# Patient Record
Sex: MALE | Race: WHITE | NOT HISPANIC OR LATINO
[De-identification: names, ages, dates, MRNs, and addresses within clinical notes are randomized per-mention and may not be internally consistent; named-entity substitution may affect disease eponyms.]

---

## 2022-01-12 ENCOUNTER — APPOINTMENT (OUTPATIENT)
Dept: ORTHOPEDIC SURGERY | Facility: CLINIC | Age: 62
End: 2022-01-12
Payer: MEDICAID

## 2022-01-12 VITALS — WEIGHT: 170 LBS | BODY MASS INDEX: 24.34 KG/M2 | HEIGHT: 70 IN

## 2022-01-12 DIAGNOSIS — Z78.9 OTHER SPECIFIED HEALTH STATUS: ICD-10-CM

## 2022-01-12 PROBLEM — Z00.00 ENCOUNTER FOR PREVENTIVE HEALTH EXAMINATION: Status: ACTIVE | Noted: 2022-01-12

## 2022-01-12 PROCEDURE — 20610 DRAIN/INJ JOINT/BURSA W/O US: CPT | Mod: LT

## 2022-01-12 PROCEDURE — 73562 X-RAY EXAM OF KNEE 3: CPT | Mod: LT

## 2022-01-12 PROCEDURE — 99204 OFFICE O/P NEW MOD 45 MIN: CPT | Mod: 25

## 2022-01-12 NOTE — PHYSICAL EXAM
[de-identified] : General: No acute distress, conversant, well-nourished.\par Head: Normocephalic, atraumatic\par Neck: trachea midline, FROM\par Heart: normotensive and normal rate and rhythm\par Lungs: No labored breathing\par Skin: No abrasions, no rashes, no edema\par Psych: Alert and oriented to person, place and time\par Extremities: no peripheral edema or digital cyanosis\par Gait: Normal gait. Can perform tandem gait.  \par Vascular: warm and well perfused distally, palpable distal pulses\par \par Left Knee with no erythema, no effusion.  \par No tenderness to palpation of knee.\par + medial joint line tenderness.\par No lateral joint line tenderness.\par \par Range of motion: 0 - 130 degrees\par + pain with range of motion.\par \par Positive Georgiana's test.\par Stable to varus and valgus stress.\par Negative Lachman's test, negative anterior and posterior drawer tests.  \par \par Sensation intact to light touch.  \par Normal motor exam.\par Warm and well perfused distally. [de-identified] : I ordered radiographs to evaluate the patient's symptoms.\par \par Left knee 3 view radiographs obtained in the office today shows no fracture or dislocation.  Mild to moderate age-related degenerative changes most prominent in medial and patellofemoral compartments.

## 2022-01-12 NOTE — HISTORY OF PRESENT ILLNESS
[de-identified] : 61 year old male presents with left knee pain.  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  About a year ago he was playing 3 times a week.   He cannot recall a specific injury but has been injured on the field.   He has been doing exercises the past few weeks but these have not allowed him to return to sport.   He denies locking, catching or instability.  He says even walking is painful.  He takes ibuprofen without relief.  He had right knee surgery in the past (MPFL repair).

## 2022-01-12 NOTE — PROCEDURE
[de-identified] : Procedure: Left knee Joint injection with corticosteroid\par Pre-Procedure Diagnosis: Left knee pain\par Post-Procedure Diagnosis: same\par \par The patient was educated about the risks and benefits of a corticosteroid injection.  Alternatives were discussed.  The patient understood and consented for the procedure.\par \par The area was sterilely prepped using isopropyl alcohol.  An ethyl chloride spray provided  local anesthesia.  Using the usual sterile technique, 1 ml of 40mg/1ml of Kenalog and 4 ml of Lidocaine 1% without epinephrine was injected into the joint.  A dressing was applied to the area.  The patient tolerated the procedure well and without complication.\par

## 2022-01-12 NOTE — ASSESSMENT
[FreeTextEntry1] : 61 year old male presents with left knee pain.  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  About a year ago he was playing 3 times a week.   He cannot recall a specific injury but has been injured on the field.   He has been doing exercises the past few weeks but these have not allowed him to return to sport.   He denies locking, catching or instability.  His exam is concerning for a meniscus tear.  His radiographs show osteoarthritis.  He will be sent for a left knee MRI.  He was given a prescription for Meloxicam.  He was given a corticosteroid injection which he tolerated well.  He was given a referral for physical therapy.  He will followup after his MRI. We discussed red flag symptoms that would require emergent evaluation. He knows to call with any questions or concerns or if his symptoms acutely worsen.

## 2022-01-18 ENCOUNTER — TRANSCRIPTION ENCOUNTER (OUTPATIENT)
Age: 62
End: 2022-01-18

## 2022-01-20 ENCOUNTER — APPOINTMENT (OUTPATIENT)
Dept: ORTHOPEDIC SURGERY | Facility: CLINIC | Age: 62
End: 2022-01-20
Payer: MEDICAID

## 2022-01-20 PROCEDURE — 99214 OFFICE O/P EST MOD 30 MIN: CPT

## 2022-01-20 NOTE — ASSESSMENT
[FreeTextEntry1] : 61 year old male followup with left knee pain.  He is an avid  and has been unable to play recently because of knee pain.  Since his last visit he reports significant improvement after his corticosteroid injection.  He is able to jog without issue.  We reviewed his left knee MRI which shows degenerative changes including tricompartmental osteoarthritis.  He has degenerative complex tearing of the lateral meniscus and a radial tear of the medial meniscus.  We discussed treatment options.  His symptoms have much improved after the injection.  He will start physical therapy.  He will continue Meloxicam as needed.  If his symptoms worsen he was given a referral to see my partner Dr Rothman a .  He will followup with me as needed.  We discussed red flag symptoms that would require emergent evaluation. He knows to call with any questions or concerns or if his symptoms acutely worsen.

## 2022-01-20 NOTE — HISTORY OF PRESENT ILLNESS
[de-identified] : 61 year old male followup with left knee pain.  He is an avid  and has been unable to play recently because of knee pain.  Since his last visit he reports significant improvement after his corticosteroid injection.  He is able to jog without issue.  He takes Meloxicam as needed with relief.   He denies locking, catching or instability. He is here to review his MRI.

## 2022-01-20 NOTE — PHYSICAL EXAM
[de-identified] : General: No acute distress, conversant, well-nourished.\par Head: Normocephalic, atraumatic\par Neck: trachea midline, FROM\par Heart: normotensive and normal rate and rhythm\par Lungs: No labored breathing\par Skin: No abrasions, no rashes, no edema\par Psych: Alert and oriented to person, place and time\par Extremities: no peripheral edema or digital cyanosis\par Gait: Normal gait. Can perform tandem gait.  \par Vascular: warm and well perfused distally, palpable distal pulses\par \par Left Knee with no erythema, no effusion.  \par No tenderness to palpation of knee.\par \par Range of motion: 0 - 130 degrees\par No pain with range of motion.\par \par Negative Georgiana's test.\par Stable to varus and valgus stress.\par Negative Lachman's test, negative anterior and posterior drawer tests.  \par \par Sensation intact to light touch.  \par Normal motor exam.\par Warm and well perfused distally. [de-identified] : Left knee MRI (1/15/22): 1.  Radial tear of the posterior root of the medial meniscus. The body is extruded.\par 2.  Complex tear of the anterior horn of the lateral meniscus. Multiseptated parameniscal cyst along the anterior horn.\par 3.  Moderate to severe medial, mild to moderate lateral, and moderate patellofemoral compartment arthrosis.\par 4.  Quadriceps fat pad edema could relate to fat pad impingement.\par 5.  Mild to moderate synovitis in the joint. Intra-articular bodies in the posterior aspect of the medial compartment and in the popliteus recess.\par \par Left knee 3 view radiographs (1/12/22) no fracture or dislocation.  Mild to moderate age-related degenerative changes most prominent in medial and patellofemoral compartments.

## 2022-07-12 ENCOUNTER — APPOINTMENT (OUTPATIENT)
Dept: ORTHOPEDIC SURGERY | Facility: CLINIC | Age: 62
End: 2022-07-12

## 2022-07-12 PROCEDURE — 20610 DRAIN/INJ JOINT/BURSA W/O US: CPT | Mod: RT

## 2022-07-12 PROCEDURE — 99214 OFFICE O/P EST MOD 30 MIN: CPT | Mod: 25

## 2022-07-12 RX ORDER — DICLOFENAC SODIUM 75 MG/1
75 TABLET, DELAYED RELEASE ORAL
Qty: 60 | Refills: 1 | Status: ACTIVE | COMMUNITY
Start: 2022-07-12 | End: 1900-01-01

## 2022-07-12 RX ORDER — DICLOFENAC SODIUM 75 MG/1
75 TABLET, DELAYED RELEASE ORAL
Qty: 60 | Refills: 0 | Status: ACTIVE | COMMUNITY
Start: 2022-07-12 | End: 1900-01-01

## 2022-07-12 NOTE — ASSESSMENT
[FreeTextEntry1] : 62 year old male followup bilateral knee pain.  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  Since his last visit he had great improvement with his left knee pain after a CSI.  He also is now in a [employer] trial for the left knee.  He would like a CSI for his right knee as this is bothering him.  His right knee is no eligible for the [employer] trial.   He was given a right knee CSI which he tolerated well.  He will continue HEP.  He can take Meloxicam as needed. He will followup in 3 months. We discussed red flag symptoms that would require emergent evaluation. He knows to call with any questions or concerns or if his symptoms acutely worsen.

## 2022-07-12 NOTE — PHYSICAL EXAM
[de-identified] : General: No acute distress, conversant, well-nourished.\par Head: Normocephalic, atraumatic\par Neck: trachea midline, FROM\par Heart: normotensive and normal rate and rhythm\par Lungs: No labored breathing\par Skin: No abrasions, no rashes, no edema\par Psych: Alert and oriented to person, place and time\par Extremities: no peripheral edema or digital cyanosis\par Gait: Normal gait. Can perform tandem gait.  \par Vascular: warm and well perfused distally, palpable distal pulses\par \par Left Knee with no erythema, no effusion.  \par No tenderness to palpation of knee.\par \par Range of motion: 0 - 130 degrees\par No pain with range of motion.\par \par Negative Georgiana's test.\par Stable to varus and valgus stress.\par Negative Lachman's test, negative anterior and posterior drawer tests.  \par \par Sensation intact to light touch.  \par Normal motor exam.\par Warm and well perfused distally.\par \par Right Knee with no erythema, no effusion.  \par No tenderness to palpation of knee.\par \par Range of motion: 0 - 130 degrees\par + pain with range of motion.\par \par Negative Georgiana's test.\par Stable to varus and valgus stress.\par Negative Lachman's test, negative anterior and posterior drawer tests.  \par \par Sensation intact to light touch.  \par Normal motor exam.\par Warm and well perfused distally. [de-identified] : Left knee MRI (1/15/22): 1.  Radial tear of the posterior root of the medial meniscus. The body is extruded.\par 2.  Complex tear of the anterior horn of the lateral meniscus. Multiseptated parameniscal cyst along the anterior horn.\par 3.  Moderate to severe medial, mild to moderate lateral, and moderate patellofemoral compartment arthrosis.\par 4.  Quadriceps fat pad edema could relate to fat pad impingement.\par 5.  Mild to moderate synovitis in the joint. Intra-articular bodies in the posterior aspect of the medial compartment and in the popliteus recess.\par \par Bilateral knee 3 view radiographs (1/12/22) no fracture or dislocation.  Mild to moderate age-related degenerative changes most prominent in medial and patellofemoral compartments.

## 2022-07-12 NOTE — REASON FOR VISIT
[Follow-Up Visit] : a follow-up visit for [Knee Pain] : knee pain [FreeTextEntry2] : Complaining of L knee pain today

## 2022-07-12 NOTE — HISTORY OF PRESENT ILLNESS
[de-identified] : 62 year old male followup bilateral knee pain.  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  Since his last visit he had great improvement with his left knee pain after a CSI.  He also is now in a [employer] trial for the left knee.  He would like a CSI for his right knee as this is bothering him.  His right knee is no eligible for the [employer] trial.   He denies locking, catching or instability.

## 2022-07-12 NOTE — PROCEDURE
[de-identified] : Procedure: Right knee Joint injection with corticosteroid\par Pre-Procedure Diagnosis: Right knee pain\par Post-Procedure Diagnosis: same\par \par The patient was educated about the risks and benefits of a corticosteroid injection.  Alternatives were discussed.  The patient understood and consented for the procedure.\par \par The area was sterilely prepped using isopropyl alcohol.  An ethyl chloride spray provided  local anesthesia.  Using the usual sterile technique, 1 ml of 40mg/1ml of Kenalog and 4 ml of Lidocaine 1% without epinephrine was injected into the joint.  A dressing was applied to the area.  The patient tolerated the procedure well and without complication.\par

## 2022-07-12 NOTE — PHYSICAL EXAM
[de-identified] : General: No acute distress, conversant, well-nourished.\par Head: Normocephalic, atraumatic\par Neck: trachea midline, FROM\par Heart: normotensive and normal rate and rhythm\par Lungs: No labored breathing\par Skin: No abrasions, no rashes, no edema\par Psych: Alert and oriented to person, place and time\par Extremities: no peripheral edema or digital cyanosis\par Gait: Normal gait. Can perform tandem gait.  \par Vascular: warm and well perfused distally, palpable distal pulses\par \par Left Knee with no erythema, no effusion.  \par No tenderness to palpation of knee.\par \par Range of motion: 0 - 130 degrees\par No pain with range of motion.\par \par Negative Georgiana's test.\par Stable to varus and valgus stress.\par Negative Lachman's test, negative anterior and posterior drawer tests.  \par \par Sensation intact to light touch.  \par Normal motor exam.\par Warm and well perfused distally.\par \par Right Knee with no erythema, no effusion.  \par No tenderness to palpation of knee.\par \par Range of motion: 0 - 130 degrees\par + pain with range of motion.\par \par Negative Georgiana's test.\par Stable to varus and valgus stress.\par Negative Lachman's test, negative anterior and posterior drawer tests.  \par \par Sensation intact to light touch.  \par Normal motor exam.\par Warm and well perfused distally. [de-identified] : Left knee MRI (1/15/22): 1.  Radial tear of the posterior root of the medial meniscus. The body is extruded.\par 2.  Complex tear of the anterior horn of the lateral meniscus. Multiseptated parameniscal cyst along the anterior horn.\par 3.  Moderate to severe medial, mild to moderate lateral, and moderate patellofemoral compartment arthrosis.\par 4.  Quadriceps fat pad edema could relate to fat pad impingement.\par 5.  Mild to moderate synovitis in the joint. Intra-articular bodies in the posterior aspect of the medial compartment and in the popliteus recess.\par \par Bilateral knee 3 view radiographs (1/12/22) no fracture or dislocation.  Mild to moderate age-related degenerative changes most prominent in medial and patellofemoral compartments.

## 2022-07-12 NOTE — PROCEDURE
[de-identified] : Procedure: Right knee Joint injection with corticosteroid\par Pre-Procedure Diagnosis: Right knee pain\par Post-Procedure Diagnosis: same\par \par The patient was educated about the risks and benefits of a corticosteroid injection.  Alternatives were discussed.  The patient understood and consented for the procedure.\par \par The area was sterilely prepped using isopropyl alcohol.  An ethyl chloride spray provided  local anesthesia.  Using the usual sterile technique, 1 ml of 40mg/1ml of Kenalog and 4 ml of Lidocaine 1% without epinephrine was injected into the joint.  A dressing was applied to the area.  The patient tolerated the procedure well and without complication.\par

## 2022-07-18 ENCOUNTER — RX RENEWAL (OUTPATIENT)
Age: 62
End: 2022-07-18

## 2022-07-18 RX ORDER — MELOXICAM 15 MG/1
15 TABLET ORAL
Qty: 30 | Refills: 0 | Status: ACTIVE | COMMUNITY
Start: 2022-01-12 | End: 1900-01-01

## 2022-10-11 ENCOUNTER — APPOINTMENT (OUTPATIENT)
Dept: ORTHOPEDIC SURGERY | Facility: CLINIC | Age: 62
End: 2022-10-11

## 2022-10-11 PROCEDURE — 99214 OFFICE O/P EST MOD 30 MIN: CPT | Mod: 25

## 2022-10-11 PROCEDURE — 20610 DRAIN/INJ JOINT/BURSA W/O US: CPT

## 2022-10-11 RX ORDER — DICLOFENAC SODIUM 75 MG/1
75 TABLET, DELAYED RELEASE ORAL
Qty: 60 | Refills: 1 | Status: ACTIVE | COMMUNITY
Start: 2022-10-11 | End: 1900-01-01

## 2022-10-11 NOTE — REASON FOR VISIT
[Follow-Up Visit] : a follow-up visit for [Knee Pain] : knee pain [FreeTextEntry2] : Would like to get new injections today

## 2022-10-11 NOTE — PHYSICAL EXAM
[de-identified] : General: No acute distress, conversant, well-nourished.\par Head: Normocephalic, atraumatic\par Neck: trachea midline, FROM\par Heart: normotensive and normal rate and rhythm\par Lungs: No labored breathing\par Skin: No abrasions, no rashes, no edema\par Psych: Alert and oriented to person, place and time\par Extremities: no peripheral edema or digital cyanosis\par Gait: Normal gait. Can perform tandem gait.  \par Vascular: warm and well perfused distally, palpable distal pulses\par \par Left Knee with no erythema, no effusion.  \par No tenderness to palpation of knee.\par \par Range of motion: 0 - 130 degrees\par No pain with range of motion.\par \par Negative Georgiana's test.\par Stable to varus and valgus stress.\par Negative Lachman's test, negative anterior and posterior drawer tests.  \par \par Sensation intact to light touch.  \par Normal motor exam.\par Warm and well perfused distally.\par \par Right Knee with no erythema, no effusion.  \par No tenderness to palpation of knee.\par \par Range of motion: 0 - 130 degrees\par + pain with range of motion.\par \par Negative Georgiana's test.\par Stable to varus and valgus stress.\par Negative Lachman's test, negative anterior and posterior drawer tests.  \par \par Sensation intact to light touch.  \par Normal motor exam.\par Warm and well perfused distally. [de-identified] : Left knee MRI (1/15/22): 1.  Radial tear of the posterior root of the medial meniscus. The body is extruded.\par 2.  Complex tear of the anterior horn of the lateral meniscus. Multiseptated parameniscal cyst along the anterior horn.\par 3.  Moderate to severe medial, mild to moderate lateral, and moderate patellofemoral compartment arthrosis.\par 4.  Quadriceps fat pad edema could relate to fat pad impingement.\par 5.  Mild to moderate synovitis in the joint. Intra-articular bodies in the posterior aspect of the medial compartment and in the popliteus recess.\par \par Bilateral knee 3 view radiographs (1/12/22) no fracture or dislocation.  Mild to moderate age-related degenerative changes most prominent in medial and patellofemoral compartments.

## 2022-10-11 NOTE — ASSESSMENT
[FreeTextEntry1] : 62 year old male followup for acute exacerbation of chronic bilateral knee pain.  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 Since his last visit he had over 2 months of relief of his right knee pain with a CSI.  His pain has returned.  He was given a right knee corticosteroid injection which he tolerated well.  He has noted locking symptoms with his right knee.  He will be sent for a right knee MRI. He can continue diclofenac.  He can continue PT. He will followup once his MRI is completed.  We discussed red flag symptoms that would require emergent evaluation. He knows to call with any questions or concerns or if his symptoms acutely worsen.

## 2022-10-11 NOTE — PROCEDURE
[de-identified] : Procedure: Right knee Joint injection with corticosteroid\par Pre-Procedure Diagnosis: Right knee pain\par Post-Procedure Diagnosis: same\par \par The patient was educated about the risks and benefits of a corticosteroid injection.  Alternatives were discussed.  The patient understood and consented for the procedure.\par \par The area was sterilely prepped using isopropyl alcohol.  An ethyl chloride spray provided  local anesthesia.  Using the usual sterile technique, 1 ml of 40mg/1ml of Kenalog and 4 ml of Lidocaine 1% without epinephrine was injected into the joint.  A dressing was applied to the area.  The patient tolerated the procedure well and without complication.\par

## 2022-10-11 NOTE — HISTORY OF PRESENT ILLNESS
[de-identified] : 62 year old male followup for acute exacerbation of chronic bilateral knee pain.  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 Since his last visit he had over 2 months of relief of his right knee pain with a CSI.  His pain has returned and he would like a new injection.  He has noted locking symptoms with his right knee.

## 2022-10-20 ENCOUNTER — APPOINTMENT (OUTPATIENT)
Dept: ORTHOPEDIC SURGERY | Facility: CLINIC | Age: 62
End: 2022-10-20

## 2022-10-20 ENCOUNTER — NON-APPOINTMENT (OUTPATIENT)
Age: 62
End: 2022-10-20

## 2022-10-20 PROCEDURE — 99214 OFFICE O/P EST MOD 30 MIN: CPT

## 2022-11-14 NOTE — PHYSICAL EXAM
[de-identified] : General: No acute distress, conversant, well-nourished.\par Head: Normocephalic, atraumatic\par Neck: trachea midline, FROM\par Heart: normotensive and normal rate and rhythm\par Lungs: No labored breathing\par Skin: No abrasions, no rashes, no edema\par Psych: Alert and oriented to person, place and time\par Extremities: no peripheral edema or digital cyanosis\par Gait: Normal gait. Can perform tandem gait. \par Vascular: warm and well perfused distally, palpable distal pulses\par \par Left Knee with no erythema, no effusion. \par No tenderness to palpation of knee.\par \par Range of motion: 0 - 130 degrees\par No pain with range of motion.\par \par Negative Georgiana's test.\par Stable to varus and valgus stress.\par Negative Lachman's test, negative anterior and posterior drawer tests. \par \par Sensation intact to light touch. \par Normal motor exam.\par Warm and well perfused distally.\par \par Right Knee with no erythema, no effusion. \par No tenderness to palpation of knee.\par \par Range of motion: 0 - 130 degrees\par + pain with range of motion.\par \par Negative Georgiana's test.\par Stable to varus and valgus stress.\par Negative Lachman's test, negative anterior and posterior drawer tests. \par \par Sensation intact to light touch. \par Normal motor exam.\par Warm and well perfused distally.  [de-identified] : Right knee MRI (10/17/22): 1. Tricompartmental arthrosis, severe in the lateral and patellofemoral compartments, moderate in the medial compartment.\par 2. Severely degenerated and torn (nearly absent) anterior horn and body of the lateral meniscus.\par 3. Small to moderate joint effusion, leaking small to moderate popliteal cyst.\par \par Left knee MRI (1/15/22): 1. Radial tear of the posterior root of the medial meniscus. The body is extruded.\par 2. Complex tear of the anterior horn of the lateral meniscus. Multiseptated parameniscal cyst along the anterior horn.\par 3. Moderate to severe medial, mild to moderate lateral, and moderate patellofemoral compartment arthrosis.\par 4. Quadriceps fat pad edema could relate to fat pad impingement.\par 5. Mild to moderate synovitis in the joint. Intra-articular bodies in the posterior aspect of the medial compartment and in the popliteus recess.\par \par Bilateral knee 3 view radiographs (1/12/22) no fracture or dislocation. Mild to moderate age-related degenerative changes most prominent in medial and patellofemoral compartments.

## 2022-11-14 NOTE — HISTORY OF PRESENT ILLNESS
[de-identified] : 62 year old male followup for acute exacerbation of chronic bilateral knee pain.  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 He reports decreased pain in his right knee after a CSI.  He takes diclofenac with relief. He is here to review his MRI.

## 2022-11-14 NOTE — ASSESSMENT
[FreeTextEntry1] : 62 year old male followup for acute exacerbation of chronic bilateral knee pain.  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 He reports decreased pain in his right knee after a CSI. We reviewed his MRI which shows degenerative changes.  He can continue diclofenac.  He can continue PT.  He will followup in 3 months. We discussed red flag symptoms that would require emergent evaluation. He knows to call with any questions or concerns or if his symptoms acutely worsen.

## 2023-02-21 ENCOUNTER — APPOINTMENT (OUTPATIENT)
Dept: ORTHOPEDIC SURGERY | Facility: CLINIC | Age: 63
End: 2023-02-21
Payer: MEDICAID

## 2023-02-21 DIAGNOSIS — M25.562 PAIN IN LEFT KNEE: ICD-10-CM

## 2023-02-21 PROCEDURE — 20610 DRAIN/INJ JOINT/BURSA W/O US: CPT

## 2023-02-21 PROCEDURE — 99214 OFFICE O/P EST MOD 30 MIN: CPT | Mod: 25

## 2023-02-21 RX ORDER — MELOXICAM 15 MG/1
15 TABLET ORAL
Qty: 30 | Refills: 2 | Status: ACTIVE | COMMUNITY
Start: 2023-02-21 | End: 1900-01-01

## 2023-02-21 NOTE — PROCEDURE
[de-identified] : Procedure: Right knee Joint injection with corticosteroid\par Pre-Procedure Diagnosis: Right knee pain\par Post-Procedure Diagnosis: same\par \par The patient was educated about the risks and benefits of a corticosteroid injection.  Alternatives were discussed.  The patient understood and consented for the procedure.\par \par The area was sterilely prepped using isopropyl alcohol.  An ethyl chloride spray provided  local anesthesia.  Using the usual sterile technique, 1 ml of 40mg/1ml of Kenalog and 4 ml of Lidocaine 1% without epinephrine was injected into the joint.  A dressing was applied to the area.  The patient tolerated the procedure well and without complication.\par

## 2023-02-21 NOTE — HISTORY OF PRESENT ILLNESS
[de-identified] : 62 year old male followup for acute exacerbation of chronic bilateral knee pain.  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 He had a right knee CSI at his last appointment that provided relief but his pain has returned and he would like a new injection. He takes meloxicam with relief.

## 2023-02-21 NOTE — PHYSICAL EXAM
[de-identified] : General: No acute distress, conversant, well-nourished.\par Head: Normocephalic, atraumatic\par Neck: trachea midline, FROM\par Heart: normotensive and normal rate and rhythm\par Lungs: No labored breathing\par Skin: No abrasions, no rashes, no edema\par Psych: Alert and oriented to person, place and time\par Extremities: no peripheral edema or digital cyanosis\par Gait: Normal gait. Can perform tandem gait.  \par Vascular: warm and well perfused distally, palpable distal pulses\par \par Left Knee with no erythema, no effusion.  \par No tenderness to palpation of knee.\par \par Range of motion: 0 - 130 degrees\par No pain with range of motion.\par \par Negative Georgiana's test.\par Stable to varus and valgus stress.\par Negative Lachman's test, negative anterior and posterior drawer tests.  \par \par Sensation intact to light touch.  \par Normal motor exam.\par Warm and well perfused distally.\par \par Right Knee with no erythema, no effusion.  \par No tenderness to palpation of knee.\par \par Range of motion: 0 - 130 degrees\par + pain with range of motion.\par \par Negative Georgiana's test.\par Stable to varus and valgus stress.\par Negative Lachman's test, negative anterior and posterior drawer tests.  \par \par Sensation intact to light touch.  \par Normal motor exam.\par Warm and well perfused distally. [de-identified] : Right knee MRI (10/17/22): 1. Tricompartmental arthrosis, severe in the lateral and patellofemoral compartments, moderate in the medial compartment.\par 2. Severely degenerated and torn (nearly absent) anterior horn and body of the lateral meniscus.\par 3. Small to moderate joint effusion, leaking small to moderate popliteal cyst.\par \par Left knee MRI (1/15/22): 1. Radial tear of the posterior root of the medial meniscus. The body is extruded.\par 2. Complex tear of the anterior horn of the lateral meniscus. Multiseptated parameniscal cyst along the anterior horn.\par 3. Moderate to severe medial, mild to moderate lateral, and moderate patellofemoral compartment arthrosis.\par 4. Quadriceps fat pad edema could relate to fat pad impingement.\par 5. Mild to moderate synovitis in the joint. Intra-articular bodies in the posterior aspect of the medial compartment and in the popliteus recess.\par \par Bilateral knee 3 view radiographs (1/12/22) no fracture or dislocation. Mild to moderate age-related degenerative changes most prominent in medial and patellofemoral compartments.

## 2023-02-21 NOTE — ASSESSMENT
[FreeTextEntry1] : 62 year old male followup for acute exacerbation of chronic bilateral knee pain.  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 He had a right knee CSI at his last appointment that provided relief but his pain has returned and he would like a new injection. He was given a new steroid injection which he tolerated well. He will continue meloxicam. He can consider PT. He has a HEP. He will followup in 3 months. We discussed red flag symptoms that would require emergent evaluation. He knows to call with any questions or concerns or if his symptoms acutely worsen.

## 2023-06-16 ENCOUNTER — APPOINTMENT (OUTPATIENT)
Dept: ORTHOPEDIC SURGERY | Facility: CLINIC | Age: 63
End: 2023-06-16
Payer: MEDICAID

## 2023-06-16 PROCEDURE — 99214 OFFICE O/P EST MOD 30 MIN: CPT | Mod: 25

## 2023-06-16 PROCEDURE — 20610 DRAIN/INJ JOINT/BURSA W/O US: CPT

## 2023-06-16 RX ORDER — MELOXICAM 15 MG/1
15 TABLET ORAL
Qty: 90 | Refills: 1 | Status: ACTIVE | COMMUNITY
Start: 2023-06-16 | End: 1900-01-01

## 2023-06-18 NOTE — PROCEDURE
[de-identified] : Procedure: Right knee Joint injection with corticosteroid\par Pre-Procedure Diagnosis: Right knee pain\par Post-Procedure Diagnosis: same\par \par The patient was educated about the risks and benefits of a corticosteroid injection.  Alternatives were discussed.  The patient understood and consented for the procedure.\par \par The area was sterilely prepped using isopropyl alcohol.  An ethyl chloride spray provided  local anesthesia.  Using the usual sterile technique, 1 ml of 40mg/1ml of Kenalog and 4 ml of Lidocaine 1% without epinephrine was injected into the joint.  A dressing was applied to the area.  The patient tolerated the procedure well and without complication.\par

## 2023-06-18 NOTE — PHYSICAL EXAM
[de-identified] : General: No acute distress, conversant, well-nourished.\par Head: Normocephalic, atraumatic\par Neck: trachea midline, FROM\par Heart: normotensive and normal rate and rhythm\par Lungs: No labored breathing\par Skin: No abrasions, no rashes, no edema\par Psych: Alert and oriented to person, place and time\par Extremities: no peripheral edema or digital cyanosis\par Gait: Normal gait. Can perform tandem gait.  \par Vascular: warm and well perfused distally, palpable distal pulses\par \par Left Knee with no erythema, no effusion.  \par No tenderness to palpation of knee.\par \par Range of motion: 0 - 130 degrees\par No pain with range of motion.\par \par Negative Georgiana's test.\par Stable to varus and valgus stress.\par Negative Lachman's test, negative anterior and posterior drawer tests.  \par \par Sensation intact to light touch.  \par Normal motor exam.\par Warm and well perfused distally.\par \par Right Knee with no erythema, no effusion.  \par No tenderness to palpation of knee.\par \par Range of motion: 0 - 130 degrees\par + pain with range of motion.\par \par Negative Georgiana's test.\par Stable to varus and valgus stress.\par Negative Lachman's test, negative anterior and posterior drawer tests.  \par \par Sensation intact to light touch.  \par Normal motor exam.\par Warm and well perfused distally. [de-identified] : Right knee MRI (10/17/22): 1. Tricompartmental arthrosis, severe in the lateral and patellofemoral compartments, moderate in the medial compartment.\par 2. Severely degenerated and torn (nearly absent) anterior horn and body of the lateral meniscus.\par 3. Small to moderate joint effusion, leaking small to moderate popliteal cyst.\par \par Left knee MRI (1/15/22): 1. Radial tear of the posterior root of the medial meniscus. The body is extruded.\par 2. Complex tear of the anterior horn of the lateral meniscus. Multiseptated parameniscal cyst along the anterior horn.\par 3. Moderate to severe medial, mild to moderate lateral, and moderate patellofemoral compartment arthrosis.\par 4. Quadriceps fat pad edema could relate to fat pad impingement.\par 5. Mild to moderate synovitis in the joint. Intra-articular bodies in the posterior aspect of the medial compartment and in the popliteus recess.\par \par Bilateral knee 3 view radiographs (1/12/22) no fracture or dislocation. Mild to moderate age-related degenerative changes most prominent in medial and patellofemoral compartments.

## 2023-06-18 NOTE — ASSESSMENT
[FreeTextEntry1] : 62 year old male followup for acute exacerbation of chronic bilateral knee pain.  He is an avid . He was given a right knee corticosteroid injection which he tolerated well. He will continue meloxicam. He can consider PT. He will followup in 3 months. We discussed red flag symptoms that would require emergent evaluation. He knows to call with any questions or concerns or if his symptoms acutely worsen.

## 2023-06-18 NOTE — HISTORY OF PRESENT ILLNESS
[de-identified] : 62 year old male followup for acute exacerbation of chronic bilateral knee pain.  He is an avid . He had a right knee CSI at his last visit that provided relief but his pain has returned and he would like a new injection. He takes meloxicam with good effect.

## 2023-10-12 ENCOUNTER — APPOINTMENT (OUTPATIENT)
Dept: ORTHOPEDIC SURGERY | Facility: CLINIC | Age: 63
End: 2023-10-12
Payer: MEDICAID

## 2023-10-12 DIAGNOSIS — M67.874 OTHER SPECIFIED DISORDERS OF TENDON, LEFT ANKLE AND FOOT: ICD-10-CM

## 2023-10-12 PROCEDURE — 20610 DRAIN/INJ JOINT/BURSA W/O US: CPT | Mod: RT

## 2023-10-12 PROCEDURE — 99214 OFFICE O/P EST MOD 30 MIN: CPT | Mod: 25

## 2024-03-28 ENCOUNTER — APPOINTMENT (OUTPATIENT)
Dept: ORTHOPEDIC SURGERY | Facility: CLINIC | Age: 64
End: 2024-03-28

## 2024-03-28 VITALS — HEIGHT: 70 IN | WEIGHT: 165 LBS | BODY MASS INDEX: 23.62 KG/M2

## 2024-03-28 DIAGNOSIS — M25.561 PAIN IN RIGHT KNEE: ICD-10-CM

## 2024-03-28 DIAGNOSIS — M25.562 PAIN IN RIGHT KNEE: ICD-10-CM

## 2024-03-28 PROCEDURE — 99214 OFFICE O/P EST MOD 30 MIN: CPT | Mod: 25

## 2024-03-28 PROCEDURE — 20610 DRAIN/INJ JOINT/BURSA W/O US: CPT | Mod: 1L

## 2024-03-28 PROCEDURE — 73562 X-RAY EXAM OF KNEE 3: CPT | Mod: RT

## 2024-03-28 RX ORDER — DICLOFENAC SODIUM 75 MG/1
75 TABLET, DELAYED RELEASE ORAL
Qty: 60 | Refills: 1 | Status: ACTIVE | COMMUNITY
Start: 2024-03-28 | End: 1900-01-01

## 2024-03-28 NOTE — HISTORY OF PRESENT ILLNESS
[de-identified] : 63 year old male followup for acute exacerbation of chronic bilateral knee pain.  He is an avid . He had a right knee CSI at his last visit that provided relief but his pain has returned and he would like a new injection. He is doing a [employer] study for his left knee.  He takes diclofenac which helps.

## 2024-03-28 NOTE — PROCEDURE
[de-identified] : Procedure: Right knee Joint injection with corticosteroid\par  Pre-Procedure Diagnosis: Right knee pain\par  Post-Procedure Diagnosis: same\par  \par  The patient was educated about the risks and benefits of a corticosteroid injection.  Alternatives were discussed.  The patient understood and consented for the procedure.\par  \par  The area was sterilely prepped using isopropyl alcohol.  An ethyl chloride spray provided  local anesthesia.  Using the usual sterile technique, 1 ml of 40mg/1ml of Kenalog and 4 ml of Lidocaine 1% without epinephrine was injected into the joint.  A dressing was applied to the area.  The patient tolerated the procedure well and without complication.\par

## 2024-03-28 NOTE — ASSESSMENT
[FreeTextEntry1] : 63 year old male followup for acute exacerbation of chronic bilateral knee pain.  He is an avid . He had a right knee CSI at his last visit that provided relief but his pain has returned and he would like a new injection. He is doing a [employer] study for his left knee.  He was given right knee CSI which he tolerated well. Continue diclofenac prn. The patient was given a referral for physical therapy.  F/U prn.  We discussed red flag symptoms that would require emergent evaluation. He knows to call with any questions or concerns or if his symptoms acutely worsen.

## 2024-03-28 NOTE — PHYSICAL EXAM
[de-identified] : General: No acute distress, conversant, well-nourished.\par  Head: Normocephalic, atraumatic\par  Neck: trachea midline, FROM\par  Heart: normotensive and normal rate and rhythm\par  Lungs: No labored breathing\par  Skin: No abrasions, no rashes, no edema\par  Psych: Alert and oriented to person, place and time\par  Extremities: no peripheral edema or digital cyanosis\par  Gait: Normal gait. Can perform tandem gait.  \par  Vascular: warm and well perfused distally, palpable distal pulses\par  \par  Left Knee with no erythema, no effusion.  \par  No tenderness to palpation of knee.\par  \par  Range of motion: 0 - 130 degrees\par  No pain with range of motion.\par  \par  Negative Georgiana's test.\par  Stable to varus and valgus stress.\par  Negative Lachman's test, negative anterior and posterior drawer tests.  \par  \par  Sensation intact to light touch.  \par  Normal motor exam.\par  Warm and well perfused distally.\par  \par  Right Knee with no erythema, no effusion.  \par  No tenderness to palpation of knee.\par  \par  Range of motion: 0 - 130 degrees\par  + pain with range of motion.\par  \par  Negative Georgiana's test.\par  Stable to varus and valgus stress.\par  Negative Lachman's test, negative anterior and posterior drawer tests.  \par  \par  Sensation intact to light touch.  \par  Normal motor exam.\par  Warm and well perfused distally. [de-identified] : I ordered radiographs to evaluate the patient's symptoms. Right knee 3 view radiographs obtained in the office today shows no fracture or dislocation.  Age-related degenerative changes. Not substantially changed from previous xrays.  Right knee MRI (10/17/22): 1. 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 2. Severely degenerated and torn (nearly absent) anterior horn and body of the lateral meniscus. 3. Small to moderate joint effusion, leaking small to moderate popliteal cyst.  Left knee MRI (1/15/22): 1. Radial tear of the posterior root of the medial meniscus. The body is extruded. 2. Complex tear of the anterior horn of the lateral meniscus. Multiseptated parameniscal cyst along the anterior horn. 3. Moderate to severe medial, mild to moderate lateral, and moderate patellofemoral compartment arthrosis. 4. Quadriceps fat pad edema could relate to fat pad impingement. 5. Mild to moderate synovitis in the joint. Intra-articular bodies in the posterior aspect of the medial compartment and in the popliteus recess.  Bilateral knee 3 view radiographs (1/12/22) no fracture or dislocation. Mild to moderate age-related degenerative changes most prominent in medial and patellofemoral compartments.

## 2024-08-13 ENCOUNTER — APPOINTMENT (OUTPATIENT)
Dept: ORTHOPEDIC SURGERY | Facility: CLINIC | Age: 64
End: 2024-08-13

## 2024-08-13 DIAGNOSIS — M25.561 PAIN IN RIGHT KNEE: ICD-10-CM

## 2024-08-13 PROCEDURE — 99214 OFFICE O/P EST MOD 30 MIN: CPT | Mod: 25

## 2024-08-13 PROCEDURE — 20610 DRAIN/INJ JOINT/BURSA W/O US: CPT

## 2024-08-13 RX ORDER — DICLOFENAC SODIUM 75 MG/1
75 TABLET, DELAYED RELEASE ORAL
Qty: 60 | Refills: 1 | Status: ACTIVE | COMMUNITY
Start: 2024-08-13 | End: 1900-01-01

## 2024-08-13 NOTE — ASSESSMENT
[FreeTextEntry1] : 65 y/o male presenting for follow up for bilateral knee pain. He is an avid . He felt sharp pain behind right knee after soccer match 1 week ago. He continues to have pain when he extends his leg. He had a right knee CSI at his last visit that provided relief but his pain has returned and he would like a new injection. He is doing a [employer] study for his left knee with good relief.  denies radicular pain, recent illness, fevers, numbness, weakness, balance problems, saddle anesthesia, urinary retention or fecal incontinence.  Likely hamstring tendonitis from recent injury. CSI injection given. Patient tolerated procedure well. The patient was given a referral for physical therapy. Continue PT. Continue diclofenac. F/U in 3 months. We discussed red flag symptoms that would require emergent evaluation.  He knows to call with any questions or concerns or if his symptoms acutely worsen.

## 2024-08-13 NOTE — HISTORY OF PRESENT ILLNESS
[de-identified] : 65 y/o male presenting for follow up for bilateral knee pain. He is an avid . He felt sharp pain behind right knee after soccer match 1 week ago. He continues to have pain when he extends his leg. He had a right knee CSI at his last visit that provided relief but his pain has returned and he would like a new injection. He is doing a [employer] study for his left knee with good relief.  denies radicular pain, recent illness, fevers, numbness, weakness, balance problems, saddle anesthesia, urinary retention or fecal incontinence.

## 2024-08-13 NOTE — PROCEDURE
[de-identified] : Procedure: Joint injection with corticosteroid Pre-Procedure Diagnosis: right knee pain  Post-Procedure Diagnosis: same   The patient was educated about the risks and benefits of a corticosteroid injection.  Alternatives were discussed.  The patient understood and consented for the procedure.   The area was sterilely prepped using isopropyl alcohol.  An ethyl chloride spray provided  local anesthesia.  Using the usual sterile technique, 1 ml of 40mg/1ml of Kenalog and 2 ml of Lidocaine 1% without epinephrine was injected into the joint.  A dressing was applied to the area.  The patient tolerated the procedure well and without complication.

## 2024-08-13 NOTE — DISCUSSION/SUMMARY
[de-identified] :  I, Dr. Spain personally performed the evaluation and management services for this established patient who presents today with (a) new problem(s)/exacerbation of (an) existing condition(s). That E/M includes conducting the clinically appropriate interval history &/or exam, assessing all new/exacerbated conditions, and establishing a new plan of care. Today, my NOAH, Rhina Velazquez was here to observe my evaluation and management service for this new problem/exacerbated condition and follow the plan of care established by me going forward.

## 2024-08-13 NOTE — PHYSICAL EXAM
[de-identified] :  General: No acute distress, conversant, well-nourished. Head: Normocephalic, atraumatic Neck: trachea midline, FROM Heart: normotensive and normal rate and rhythm Lungs: No labored breathing Skin: No abrasions, no rashes, no edema Psych: Alert and oriented to person, place and time Extremities: no peripheral edema or digital cyanosis Gait: Normal gait. Can perform tandem gait.  Vascular: warm and well perfused distally, palpable distal pulses  Knee with no erythema, no effusion.  + tenderness to palpation over hamstring tendon No medial joint line tenderness. No lateral joint line tenderness.   Range of motion: 0 - 130 degrees No pain with range of motion.   Negative Georgiana's test. Stable to varus and valgus stress. Negative Lachman's test, negative anterior and posterior drawer tests.    Sensation intact to light touch.  Normal motor exam. Warm and well perfused distally.   [de-identified] : Right knee MRI (10/17/22): 1. 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 2. Severely degenerated and torn (nearly absent) anterior horn and body of the lateral meniscus. 3. Small to moderate joint effusion, leaking small to moderate popliteal cyst.  Left knee MRI (1/15/22): 1. Radial tear of the posterior root of the medial meniscus. The body is extruded. 2. Complex tear of the anterior horn of the lateral meniscus. Multiseptated parameniscal cyst along the anterior horn. 3. Moderate to severe medial, mild to moderate lateral, and moderate patellofemoral compartment arthrosis. 4. Quadriceps fat pad edema could relate to fat pad impingement. 5. Mild to moderate synovitis in the joint. Intra-articular bodies in the posterior aspect of the medial compartment and in the popliteus recess.  Bilateral knee 3 view radiographs (1/12/22) no fracture or dislocation. Mild to moderate age-related degenerative changes most prominent in medial and patellofemoral compartments.

## 2024-11-12 ENCOUNTER — APPOINTMENT (OUTPATIENT)
Dept: ORTHOPEDIC SURGERY | Facility: CLINIC | Age: 64
End: 2024-11-12
Payer: MEDICAID

## 2024-11-12 DIAGNOSIS — M25.561 PAIN IN RIGHT KNEE: ICD-10-CM

## 2024-11-12 DIAGNOSIS — M25.562 PAIN IN LEFT KNEE: ICD-10-CM

## 2024-11-12 PROCEDURE — 99214 OFFICE O/P EST MOD 30 MIN: CPT | Mod: 25

## 2024-11-12 PROCEDURE — 20610 DRAIN/INJ JOINT/BURSA W/O US: CPT | Mod: RT

## 2024-11-12 RX ORDER — DICLOFENAC SODIUM 75 MG/1
75 TABLET, DELAYED RELEASE ORAL
Qty: 60 | Refills: 1 | Status: ACTIVE | COMMUNITY
Start: 2024-11-12 | End: 1900-01-01

## 2025-02-11 ENCOUNTER — APPOINTMENT (OUTPATIENT)
Dept: ORTHOPEDIC SURGERY | Facility: CLINIC | Age: 65
End: 2025-02-11
Payer: MEDICAID

## 2025-02-11 DIAGNOSIS — M25.562 PAIN IN RIGHT KNEE: ICD-10-CM

## 2025-02-11 DIAGNOSIS — M25.561 PAIN IN RIGHT KNEE: ICD-10-CM

## 2025-02-11 PROCEDURE — 99214 OFFICE O/P EST MOD 30 MIN: CPT | Mod: 25

## 2025-02-11 PROCEDURE — 20610 DRAIN/INJ JOINT/BURSA W/O US: CPT | Mod: 50

## 2025-02-11 RX ORDER — DICLOFENAC SODIUM 75 MG/1
75 TABLET, DELAYED RELEASE ORAL
Qty: 180 | Refills: 1 | Status: ACTIVE | COMMUNITY
Start: 2025-02-11 | End: 1900-01-01

## 2025-05-08 ENCOUNTER — APPOINTMENT (OUTPATIENT)
Dept: ORTHOPEDIC SURGERY | Facility: CLINIC | Age: 65
End: 2025-05-08
Payer: MEDICAID

## 2025-05-08 DIAGNOSIS — M25.562 PAIN IN RIGHT KNEE: ICD-10-CM

## 2025-05-08 DIAGNOSIS — M25.561 PAIN IN RIGHT KNEE: ICD-10-CM

## 2025-05-08 PROCEDURE — 20610 DRAIN/INJ JOINT/BURSA W/O US: CPT | Mod: 50

## 2025-05-08 PROCEDURE — 99214 OFFICE O/P EST MOD 30 MIN: CPT | Mod: 25

## 2025-05-08 RX ORDER — DICLOFENAC SODIUM 75 MG/1
75 TABLET, DELAYED RELEASE ORAL
Qty: 60 | Refills: 3 | Status: ACTIVE | COMMUNITY
Start: 2025-05-08 | End: 1900-01-01

## 2025-07-30 ENCOUNTER — APPOINTMENT (OUTPATIENT)
Dept: ORTHOPEDIC SURGERY | Facility: CLINIC | Age: 65
End: 2025-07-30
Payer: MEDICAID

## 2025-07-30 DIAGNOSIS — M25.561 PAIN IN RIGHT KNEE: ICD-10-CM

## 2025-07-30 DIAGNOSIS — M25.562 PAIN IN RIGHT KNEE: ICD-10-CM

## 2025-07-30 PROCEDURE — 99214 OFFICE O/P EST MOD 30 MIN: CPT | Mod: 25

## 2025-07-30 PROCEDURE — 20610 DRAIN/INJ JOINT/BURSA W/O US: CPT | Mod: 50

## 2025-07-30 RX ORDER — MELOXICAM 15 MG/1
15 TABLET ORAL
Qty: 30 | Refills: 2 | Status: ACTIVE | COMMUNITY
Start: 2025-07-30 | End: 1900-01-01